# Patient Record
Sex: MALE | Race: BLACK OR AFRICAN AMERICAN | Employment: FULL TIME | RURAL
[De-identification: names, ages, dates, MRNs, and addresses within clinical notes are randomized per-mention and may not be internally consistent; named-entity substitution may affect disease eponyms.]

---

## 2022-01-10 ENCOUNTER — HOSPITAL ENCOUNTER (EMERGENCY)
Age: 32
Discharge: HOME | End: 2022-01-10
Attending: EMERGENCY MEDICINE

## 2022-01-10 VITALS
SYSTOLIC BLOOD PRESSURE: 128 MMHG | HEART RATE: 56 BPM | BODY MASS INDEX: 24.23 KG/M2 | HEIGHT: 76 IN | RESPIRATION RATE: 17 BRPM | DIASTOLIC BLOOD PRESSURE: 77 MMHG | WEIGHT: 199 LBS | OXYGEN SATURATION: 98 % | TEMPERATURE: 98.2 F

## 2022-01-10 DIAGNOSIS — K02.9 DENTAL CARIES: ICD-10-CM

## 2022-01-10 DIAGNOSIS — K08.89 PAIN, DENTAL: ICD-10-CM

## 2022-01-10 DIAGNOSIS — R51.9 HEAD AND FACE PAIN: Primary | ICD-10-CM

## 2022-01-10 PROCEDURE — 74011000250 HC RX REV CODE- 250: Performed by: EMERGENCY MEDICINE

## 2022-01-10 PROCEDURE — 99282 EMERGENCY DEPT VISIT SF MDM: CPT

## 2022-01-10 PROCEDURE — 74011250637 HC RX REV CODE- 250/637: Performed by: EMERGENCY MEDICINE

## 2022-01-10 RX ORDER — PENICILLIN V POTASSIUM 250 MG/1
500 TABLET, FILM COATED ORAL
Status: COMPLETED | OUTPATIENT
Start: 2022-01-10 | End: 2022-01-10

## 2022-01-10 RX ORDER — NAPROXEN 250 MG/1
500 TABLET ORAL
Status: COMPLETED | OUTPATIENT
Start: 2022-01-10 | End: 2022-01-10

## 2022-01-10 RX ORDER — NAPROXEN 500 MG/1
500 TABLET ORAL
Qty: 20 TABLET | Refills: 0 | Status: SHIPPED | OUTPATIENT
Start: 2022-01-10

## 2022-01-10 RX ORDER — PENICILLIN V POTASSIUM 500 MG/1
500 TABLET, FILM COATED ORAL 4 TIMES DAILY
Qty: 28 TABLET | Refills: 0 | Status: SHIPPED | OUTPATIENT
Start: 2022-01-10 | End: 2022-01-17

## 2022-01-10 RX ADMIN — LIDOCAINE HYDROCHLORIDE: 20 SOLUTION ORAL; TOPICAL at 06:49

## 2022-01-10 RX ADMIN — NAPROXEN 500 MG: 250 TABLET ORAL at 06:49

## 2022-01-10 RX ADMIN — PENICILLIN V POTASIUM 500 MG: 250 TABLET ORAL at 06:49

## 2022-01-10 NOTE — ED TRIAGE NOTES
Patient states that he has been having dental pain for the past couple of weeks in his right upper dental area. . Patient states that he has a dental appointment but could not handle the pain.  10/10

## 2022-01-10 NOTE — ED PROVIDER NOTES
EMERGENCY DEPARTMENT HISTORY AND PHYSICAL EXAM      Date: 1/10/2022  Patient Name: Miguelina Singh    History of Presenting Illness     Chief Complaint   Patient presents with    Dental Pain       History Provided By: Patient    HPI:   The history is provided by the patient. Dental Pain   This is a recurrent problem. The current episode started more than 1 week ago. The problem occurs constantly. The problem has not changed since onset. The pain is located in the right upper mouth. The pain is moderate. There was no vomiting, no nausea, no fever, no swelling, no headaches and no drainage. He has tried acetaminophen for the symptoms. The treatment provided no relief. The patient has no cardiac history. Miguelina Singh, 27 y.o. male  presents to the ED with cc of right upper dental pain with a dental caries. Reports has been vomiting for last 2 weeks, he made appointment with the dental clinic in 2 weeks. He took Tylenol for pain. He reports the pain is worse with air exposure and cold liquids. Patient reports the pain got worse over the last day or so, had difficult time sleeping last night due to the increasing pain. He denies any facial swelling denies any fevers chills denies any other upper respiratory symptoms. Denies any trauma. There are no other complaints, changes, or physical findings at this time. PCP: None    No current facility-administered medications on file prior to encounter. Current Outpatient Medications on File Prior to Encounter   Medication Sig Dispense Refill    fluticasone propionate (FLONASE) 50 mcg/actuation nasal spray 2 Sprays by Both Nostrils route daily. 1 Bottle 0    naproxen (NAPROSYN) 500 mg tablet Take 1 Tab by mouth every twelve (12) hours as needed for Pain. 20 Tab 0       Past History     Past Medical History:  No past medical history on file. Past Surgical History:  No past surgical history on file.     Family History:  No family history on file.    Social History:  Social History     Tobacco Use    Smoking status: Current Every Day Smoker    Smokeless tobacco: Never Used   Substance Use Topics    Alcohol use: Yes     Comment: occ    Drug use: No       Allergies:  No Known Allergies      Review of Systems   Review of Systems   Constitutional: Negative. Negative for chills and fever. HENT: Positive for dental problem. Negative for congestion, ear pain, rhinorrhea and sore throat. Respiratory: Negative. Negative for cough and shortness of breath. Cardiovascular: Negative. Negative for chest pain and palpitations. Gastrointestinal: Negative. Negative for nausea and vomiting. Endocrine: Negative. Musculoskeletal: Negative. Negative for arthralgias, back pain and neck pain. Skin: Negative. Negative for rash and wound. Allergic/Immunologic: Negative. Neurological: Negative. Negative for dizziness and headaches. Hematological: Negative. Negative for adenopathy. Does not bruise/bleed easily. Psychiatric/Behavioral: Negative. Negative for confusion. The patient is not nervous/anxious. All other systems reviewed and are negative. Physical Exam   Physical Exam  Vitals and nursing note reviewed. Constitutional:       General: He is not in acute distress. Appearance: Normal appearance. He is well-developed. He is not ill-appearing, toxic-appearing or diaphoretic. HENT:      Head: Normocephalic and atraumatic. Nose: Nose normal.      Mouth/Throat:      Lips: Pink. Mouth: Mucous membranes are moist.      Dentition: Dental tenderness and dental caries present. No dental abscesses. Tongue: No lesions. Palate: No lesions. Pharynx: Oropharynx is clear. No pharyngeal swelling or posterior oropharyngeal erythema. Tonsils: No tonsillar exudate. Eyes:      Extraocular Movements: Extraocular movements intact.       Conjunctiva/sclera: Conjunctivae normal.      Pupils: Pupils are equal, round, and reactive to light. Cardiovascular:      Rate and Rhythm: Normal rate and regular rhythm. Pulses: Normal pulses. Pulmonary:      Effort: Pulmonary effort is normal. No respiratory distress. Abdominal:      General: There is no distension. Palpations: Abdomen is soft. Musculoskeletal:         General: No swelling or tenderness. Normal range of motion. Cervical back: Normal range of motion and neck supple. No rigidity or tenderness. No muscular tenderness. Skin:     General: Skin is warm and dry. Capillary Refill: Capillary refill takes less than 2 seconds. Findings: No erythema or rash. Neurological:      General: No focal deficit present. Mental Status: He is alert and oriented to person, place, and time. Mental status is at baseline. Cranial Nerves: No cranial nerve deficit. Sensory: No sensory deficit. Motor: No weakness. Psychiatric:         Mood and Affect: Mood normal.         Behavior: Behavior normal.         Thought Content: Thought content normal.         Judgment: Judgment normal.         Diagnostic Study Results     Labs -   No results found for this or any previous visit (from the past 12 hour(s)). Radiologic Studies -   No orders to display     CT Results  (Last 48 hours)    None        CXR Results  (Last 48 hours)    None          Medical Decision Making   I am the first provider for this patient. I reviewed the vital signs, available nursing notes, past medical history, past surgical history, family history and social history. Vital Signs-Reviewed the patient's vital signs. No data found. Records Reviewed: Nursing Notes and Old Medical Records    Provider Notes (Medical Decision Making):   Patient with dental caries, no evidence of dental abscess will start on antibiotics anti-inflammatories and give dental balls to take for pain. ED Course:   Initial assessment performed.  The patients presenting problems have been discussed, and they are in agreement with the care plan formulated and outlined with them. I have encouraged them to ask questions as they arise throughout their visit. Medications Given in the ED:    Medications   naproxen (NAPROSYN) tablet 500 mg (has no administration in time range)   penicillin v potassium (VEETID) tablet 500 mg (has no administration in time range)   dental ball (lidocaine/Benadryl/Cetacaine) mixture (has no administration in time range)           6:31 AM    Patient presents complaining of dental pain has dental caries on exam no abscess, will start on antibiotics to cover for infection give naproxen and dental balls to use for pain, he will see the dental clinic for follow-up. Pt has been re-examined and states that they are feeling better and have no new complaints. medications, diagnosis, follow up plan and return instructions have been reviewed and discussed with the patient and/or family. Pt and/or family were instructed on symptoms that may arise after discharge requiring re-evaluation by a physician. Pt and/or family have had the opportunity to ask questions about their care. Patient and/or family verbalized understanding and agreement with care plan, follow up and return instructions. Patient and/or family agree to return in 50 hours if their symptoms are not improving or immediately if they have any change in their condition. I have also put together some discharge instructions for patient that include: 1) educational information regarding their diagnosis, 2) how to care for their diagnosis at home, as well a 3) list of reasons why they would want to return to the ED prior to their follow-up appointment, should their condition change. Ita Echeverria MD      Procedures        Disposition:    Discharged    DISCHARGE PLAN:  1.    Current Discharge Medication List      START taking these medications    Details   penicillin v potassium (VEETID) 500 mg tablet Take 1 Tablet by mouth four (4) times daily for 7 days. Qty: 28 Tablet, Refills: 0  Start date: 1/10/2022, End date: 1/17/2022      !! naproxen (NAPROSYN) 500 mg tablet Take 1 Tablet by mouth every twelve (12) hours as needed for Pain. Qty: 20 Tablet, Refills: 0  Start date: 1/10/2022       !! - Potential duplicate medications found. Please discuss with provider. CONTINUE these medications which have NOT CHANGED    Details   !! naproxen (NAPROSYN) 500 mg tablet Take 1 Tab by mouth every twelve (12) hours as needed for Pain. Qty: 20 Tab, Refills: 0       !! - Potential duplicate medications found. Please discuss with provider. 2.   Follow-up Information     Follow up With Specialties Details Why [address] [place] [address]  Schedule an appointment as soon as possible for a visit in 2 days For follow up [address] [place] [address]  [phone]        3. Return to ED if worse     Diagnosis     Clinical Impression:   1. Head and face pain    2. Dental caries    3. Pain, dental        Attestations: Joanie Mijares MD    Please note that this dictation was completed with [employer], the computer voice recognition software. Quite often unanticipated grammatical, syntax, homophones, and other interpretive errors are inadvertently transcribed by the computer software. Please disregard these errors. Please excuse any errors that have escaped final proofreading. Thank you.

## 2022-06-26 ENCOUNTER — HOSPITAL ENCOUNTER (EMERGENCY)
Age: 32
Discharge: HOME | End: 2022-06-26
Attending: EMERGENCY MEDICINE

## 2022-06-26 ENCOUNTER — APPOINTMENT (OUTPATIENT)
Dept: GENERAL RADIOLOGY | Age: 32
End: 2022-06-26
Attending: EMERGENCY MEDICINE

## 2022-06-26 VITALS
TEMPERATURE: 98.8 F | OXYGEN SATURATION: 100 % | WEIGHT: 192 LBS | BODY MASS INDEX: 23.38 KG/M2 | HEIGHT: 76 IN | RESPIRATION RATE: 16 BRPM | HEART RATE: 89 BPM | SYSTOLIC BLOOD PRESSURE: 119 MMHG | DIASTOLIC BLOOD PRESSURE: 49 MMHG

## 2022-06-26 DIAGNOSIS — S61.216A LACERATION OF RIGHT LITTLE FINGER WITHOUT FOREIGN BODY WITHOUT DAMAGE TO NAIL, INITIAL ENCOUNTER: ICD-10-CM

## 2022-06-26 DIAGNOSIS — S61.411A LACERATION OF RIGHT HAND WITHOUT FOREIGN BODY, INITIAL ENCOUNTER: Primary | ICD-10-CM

## 2022-06-26 PROCEDURE — 74011000250 HC RX REV CODE- 250: Performed by: EMERGENCY MEDICINE

## 2022-06-26 PROCEDURE — 74011250637 HC RX REV CODE- 250/637: Performed by: EMERGENCY MEDICINE

## 2022-06-26 PROCEDURE — 90715 TDAP VACCINE 7 YRS/> IM: CPT | Performed by: EMERGENCY MEDICINE

## 2022-06-26 PROCEDURE — 75810000293 HC SIMP/SUPERF WND  RPR

## 2022-06-26 PROCEDURE — 73130 X-RAY EXAM OF HAND: CPT

## 2022-06-26 PROCEDURE — 74011250636 HC RX REV CODE- 250/636: Performed by: EMERGENCY MEDICINE

## 2022-06-26 PROCEDURE — 90471 IMMUNIZATION ADMIN: CPT

## 2022-06-26 PROCEDURE — 99284 EMERGENCY DEPT VISIT MOD MDM: CPT

## 2022-06-26 RX ORDER — BUPIVACAINE HYDROCHLORIDE 5 MG/ML
5 INJECTION, SOLUTION EPIDURAL; INTRACAUDAL ONCE
Status: COMPLETED | OUTPATIENT
Start: 2022-06-26 | End: 2022-06-26

## 2022-06-26 RX ORDER — OXYCODONE HYDROCHLORIDE 5 MG/1
5 TABLET ORAL
Status: DISCONTINUED | OUTPATIENT
Start: 2022-06-26 | End: 2022-06-26 | Stop reason: HOSPADM

## 2022-06-26 RX ORDER — LIDOCAINE HYDROCHLORIDE 10 MG/ML
5 INJECTION, SOLUTION EPIDURAL; INFILTRATION; INTRACAUDAL; PERINEURAL ONCE
Status: DISCONTINUED | OUTPATIENT
Start: 2022-06-26 | End: 2022-06-26

## 2022-06-26 RX ORDER — DOXYCYCLINE HYCLATE 100 MG
100 TABLET ORAL 2 TIMES DAILY
Qty: 14 TABLET | Refills: 0 | Status: SHIPPED | OUTPATIENT
Start: 2022-06-26 | End: 2022-07-03

## 2022-06-26 RX ORDER — LIDOCAINE HYDROCHLORIDE 10 MG/ML
5 INJECTION INFILTRATION; PERINEURAL ONCE
Status: COMPLETED | OUTPATIENT
Start: 2022-06-26 | End: 2022-06-26

## 2022-06-26 RX ORDER — OXYCODONE HYDROCHLORIDE 5 MG/1
5 TABLET ORAL
Status: COMPLETED | OUTPATIENT
Start: 2022-06-26 | End: 2022-06-26

## 2022-06-26 RX ADMIN — OXYCODONE 5 MG: 5 TABLET ORAL at 12:13

## 2022-06-26 RX ADMIN — TETANUS TOXOID, REDUCED DIPHTHERIA TOXOID AND ACELLULAR PERTUSSIS VACCINE, ADSORBED 0.5 ML: 5; 2.5; 8; 8; 2.5 SUSPENSION INTRAMUSCULAR at 12:23

## 2022-06-26 RX ADMIN — LIDOCAINE HYDROCHLORIDE 5 ML: 10 INJECTION, SOLUTION INFILTRATION; PERINEURAL at 12:13

## 2022-06-26 RX ADMIN — BUPIVACAINE HYDROCHLORIDE 25 MG: 5 INJECTION, SOLUTION EPIDURAL; INTRACAUDAL at 12:13

## 2022-06-26 NOTE — Clinical Note
[address] [place] [address] EMERGENCY DEP  [address] [facility] [address] Laruen Gray [address]  [phone]    Work/School Note    Date: 6/26/2022    To Whom It May concern:    Geri Archer was seen and treated today in the emergency room by the following provider(s):  Attending Provider: Rachel [place] DO. Geri Archer is excused from work/school on 6/26/2022 through 6/28/2022. He is medically clear to return to work/school on 6/29/2022.          Sincerely,          Sanjuanita Salazar DO

## 2022-06-26 NOTE — Clinical Note
[address] [place] [address] EMERGENCY DEP  [address] [facility] [address] Lit [place] [address]  [phone]    Work/School Note    Date: 6/26/2022    To Whom It May concern:    Be Kennedy was seen and treated today in the emergency room by the following provider(s):  Attending Provider: Kristal Foster DO. Be Kennedy is excused from work/school on 06/26/22 and 06/27/22. He is medically clear to return to work/school on 6/28/2022.        Sincerely,          Mari Crandall DO

## 2022-06-26 NOTE — Clinical Note
[address] [place] [address] EMERGENCY DEP  [address] [facility] [address] Shiela Reddy [address]  [phone]    Work/School Note    Date: 6/26/2022    To Whom It May concern:    Román Rosas was seen and treated today in the emergency room by the following provider(s):  Attending Provider: Jn Walter DO. Román Rosas is excused from work/school on 06/26/22 and 06/27/22. He is medically clear to return to work/school on 6/28/2022.        Sincerely,          Rubin Malloy DO

## 2022-06-26 NOTE — Clinical Note
[address] [place] [address] EMERGENCY DEP  [address] [facility] [address] Natalia Rodney [address]  [phone]    Work/School Note    Date: 6/26/2022    To Whom It May concern:    Vlad Gtz was seen and treated today in the emergency room by the following provider(s):  Attending Provider: Ava Garcia DO. Vlad Gtz is excused from work/school on 6/26/2022 through 6/28/2022. He is medically clear to return to work/school on 6/29/2022.          Sincerely,          Sylvia Platt DO

## 2022-06-27 NOTE — ED PROVIDER NOTES
EMERGENCY DEPARTMENT HISTORY AND PHYSICAL EXAM      Date: 6/26/2022  Patient Name: Renuka Suero    History of Presenting Illness     Chief Complaint   Patient presents with    Laceration       History Provided By: Patient    HPI: Renuka Suero, 27 y.o. male presents to the ED with cc of right hand injury. Patient states he was using a chain saw to do some tree work and had shot the engine down however the saw was still running and when he went to turn it caused him in his right hand. This resulted in a laceration in between his thumb and second finger and an additional laceration to the palmar aspect of the right fifth digit. Bleeding had been controlled upon arrival to the emergency department. Patient states pain is rated a 10 out of 10 worse with any movement or touch. He denies any other injuries. He states his tetanus is not up-to-date. He denies any other recent illness. He denies any history of bleeding disorders. There are no other complaints, changes, or physical findings at this time. PCP: None    No current facility-administered medications on file prior to encounter. Current Outpatient Medications on File Prior to Encounter   Medication Sig Dispense Refill    naproxen (NAPROSYN) 500 mg tablet Take 1 Tablet by mouth every twelve (12) hours as needed for Pain. 20 Tablet 0    fluticasone propionate (FLONASE) 50 mcg/actuation nasal spray 2 Sprays by Both Nostrils route daily. 1 Bottle 0    naproxen (NAPROSYN) 500 mg tablet Take 1 Tab by mouth every twelve (12) hours as needed for Pain.  20 Tab 0       Past History     Past Medical History:  None    Past Surgical History:  None    Family History:  Non-contributory    Social History:  Social History     Tobacco Use    Smoking status: Current Every Day Smoker    Smokeless tobacco: Never Used   Substance Use Topics    Alcohol use: Yes     Comment: occ    Drug use: No       Allergies:  No Known Allergies      Review of Systems Review of Systems   Constitutional: Negative. HENT: Negative. Respiratory: Negative. Cardiovascular: Negative. Musculoskeletal:        Right hand pain    Skin: Positive for wound (Lacerations to right hand). Neurological: Negative. Negative for weakness and numbness. Hematological: Does not bruise/bleed easily. Psychiatric/Behavioral: Negative. Physical Exam   Physical Exam  Vitals and nursing note reviewed. Constitutional:       General: He is not in acute distress. Appearance: Normal appearance. HENT:      Head: Normocephalic and atraumatic. Mouth/Throat:      Mouth: Mucous membranes are moist.   Cardiovascular:      Rate and Rhythm: Normal rate and regular rhythm. Pulses: Normal pulses. Pulmonary:      Effort: Pulmonary effort is normal. No respiratory distress. Breath sounds: Normal breath sounds. No stridor. Musculoskeletal:        Hands:    Skin:     General: Skin is warm and dry. Capillary Refill: Capillary refill takes less than 2 seconds. Neurological:      Mental Status: He is alert and oriented to person, place, and time. Cranial Nerves: No cranial nerve deficit. Comments: No gross motor or sensory deficits    Psychiatric:         Mood and Affect: Mood normal.         Behavior: Behavior normal.         Diagnostic Study Results     Labs -  None    Radiologic Studies -   XR HAND RT MIN 3 V   Final Result   No acute bony abnormality or radiopaque foreign body. Thenar soft   tissue injury is noted. CT Results  (Last 48 hours)    None        CXR Results  (Last 48 hours)    None          Medical Decision Making   I am the first provider for this patient. I reviewed the vital signs, available nursing notes, past medical history, past surgical history, family history and social history. Vital Signs-Reviewed the patient's vital signs.   Patient Vitals for the past 12 hrs:   Temp Pulse Resp BP SpO2   06/26/22 1200 98.8 °F (37.1 °C) 89 16 (!) 119/49 100 %     Records Reviewed: Nursing Notes    Provider Notes (Medical Decision Making):   DDx- Lacerations to hand, hand fx    ED Course:   Initial assessment performed. The patients presenting problems have been discussed, and they are in agreement with the care plan formulated and outlined with them. I have encouraged them to ask questions as they arise throughout their visit. Wound between thumg explored- no tendon involvement, Full ROM of thumb and 2nd digit against resistance. Tetanus updated. PROCEDURES  Procedure Note - Laceration Repair:  Procedure by Jaylin Santos DO  Complexity: Simple  3cm linear/jagged laceration to right hand in web space between 1st and 2nd digits  was irrigated copiously with NS under jet lavage, prepped withhibiclens and draped in a sterile fashion. The area was anesthetized with 4 mLs of 1% Lidocaine w/o, 0.5% Marcaine 1:1} via local infiltration. The wound was explored with the following results: No foreign bodies found, No tendon laceration seen, Extensor tendon laceration seen, Flexor tendon laceration seen. The wound was repaired with One layer suture closure: Skin Layer:  7 sutures placed, stitch type:simple interrupted, suture: 4-0 nylon. .  The wound was closed with good hemostasis and approximation. Sterile dressing applied. Estimated blood loss: <5mls  The procedure took 15 minutes, and pt tolerated well. Procedure Note - Laceration Repair:  Procedure by Jaylin Santos DO  Complexity: Simple  1.5cm linear laceration to right hand 5th digit proximal phalanx palmar side  was irrigated copiously with NS under jet lavage, prepped withhibiclens and draped in a sterile fashion. The area was anesthetized with 1ml mLs of 1% Lidocaine w/o, 0.5% Marcaine 1:1} via local infiltration.   The wound was explored with the following results: No foreign bodies found, No tendon laceration seen, Extensor tendon laceration seen, Flexor tendon laceration seen.  The wound was repaired with One layer suture closure: Skin Layer:  4 sutures placed, stitch type:simple interrupted, suture: 4-0 nylon. .  The wound was closed with good hemostasis and approximation. Sterile dressing applied. Estimated blood loss: <5mls  The procedure took 10 minutes, and pt tolerated well. Disposition:  DC home, suture removal 10-14 days, RX for AB written discussed with pt to fill he begins developing redness or streaking up the arm. DISCHARGE PLAN:  1. Discharge Medication List as of 6/26/2022  1:39 PM      START taking these medications    Details   doxycycline (VIBRA-TABS) 100 mg tablet Take 1 Tablet by mouth two (2) times a day for 7 days. , Print, Disp-14 Tablet, R-0         CONTINUE these medications which have NOT CHANGED    Details   !! naproxen (NAPROSYN) 500 mg tablet Take 1 Tablet by mouth every twelve (12) hours as needed for Pain., Print, Disp-20 Tablet, R-0      fluticasone propionate (FLONASE) 50 mcg/actuation nasal spray 2 Sprays by Both Nostrils route daily. , Print, Disp-1 Bottle, R-0      !! naproxen (NAPROSYN) 500 mg tablet Take 1 Tab by mouth every twelve (12) hours as needed for Pain., Print, Disp-20 Tab, R-0       !! - Potential duplicate medications found. Please discuss with provider. 2.   Follow-up Information     Follow up With Specialties Details Why Contact Info    [address] [place] [address] Emergency Medicine  10-14 Days, For suture removal [address]        3. Return to ED if worse     Diagnosis     Clinical Impression:   1. Laceration of right hand without foreign body, initial encounter    2. Laceration of right little finger without foreign body without damage to nail, initial encounter        Attestations:    Jaylin Santos, DO    Please note that this dictation was completed with [employer] voice recognition software.   Quite often unanticipated grammatical, syntax, homophones, and other interpretive errors are inadvertently transcribed by the computer software. Please disregard these errors. Please excuse any errors that have escaped final proofreading. Thank you.

## 2022-07-08 ENCOUNTER — HOSPITAL ENCOUNTER (EMERGENCY)
Age: 32
Discharge: HOME | End: 2022-07-08
Attending: FAMILY MEDICINE

## 2022-07-08 VITALS
RESPIRATION RATE: 16 BRPM | OXYGEN SATURATION: 96 % | TEMPERATURE: 98.4 F | SYSTOLIC BLOOD PRESSURE: 123 MMHG | HEART RATE: 69 BPM | DIASTOLIC BLOOD PRESSURE: 61 MMHG

## 2022-07-08 DIAGNOSIS — Z48.02 ENCOUNTER FOR REMOVAL OF SUTURES: Primary | ICD-10-CM

## 2022-07-08 PROCEDURE — 75810000275 HC EMERGENCY DEPT VISIT NO LEVEL OF CARE

## 2022-07-08 NOTE — Clinical Note
[address] [place] [address] EMERGENCY DEP  [address] [facility] [address] Jazlyn Dillon [address]  [phone]    Work/School Note    Date: 7/8/2022    To Whom It May concern:    Terrell Salazar was seen and treated today in the emergency room by the following provider(s):  Attending Provider: Kimani Dunlap MD.      Terrell Salazar is excused from work/school on 7/8/2022 through 7/10/2022. He is medically clear to return to work/school on 7/11/2022.          Sincerely,          Ailyn Pryor MD

## 2022-07-08 NOTE — Clinical Note
[address] [place] [address] EMERGENCY DEP  [address] [facility] [address] Suha [place] [address]  [phone]    Work/School Note    Date: 7/8/2022    To Whom It May concern:    Kash Retana was seen and treated today in the emergency room by the following provider(s):  Attending Provider: Mariann Essex, MD.      Kash Retana is excused from work/school on 7/8/2022 through 7/10/2022. He is medically clear to return to work/school on 7/11/2022.          Sincerely,          Romina Curiel MD

## 2022-07-09 NOTE — ED TRIAGE NOTES
Pt arrived POV to have stitches removed from R hand. Pt states the stitches have been in for approx. 12 days.

## 2022-07-09 NOTE — DISCHARGE INSTRUCTIONS
Return for evidence of infection or problems as noted above. Keep wound clean and until closed, Tylenol or Naprosyn as needed for pain.

## 2022-07-09 NOTE — ED PROVIDER NOTES
[address] [place] [address]   EMERGENCY DEPARTMENT          Date: 7/8/2022  Patient: Verónica Walter MRN: [id number]  SSN: [id number]    YOB: 1990  Age: 27 y.o. Sex: male      PCP: None  The patient arrived by private car and is alone. History of Presenting Illness     Chief Complaint   Patient presents with    Wound Check       History Provided By: Patient    HPI: Verónica Walter is a 27 y.o. male, pmhx laceration, who presents ambulatory to the ED c/o wound check, need for suture removal.  Patient sustained a chainsaw injury to his right hand on June 26, 2022, 12 days prior to arrival.  He presented to this facility for evaluation and management. X-rays were unremarkable and he received a total of 11 sutures to his right hand. Patient states that since going home he is doing well. He has had no drainage fever bleeding or spreading redness. Pain is being controlled. Several of the sutures have come out spontaneously he states. Past History   History reviewed. No pertinent past medical history. No past surgical history on file. History reviewed. No pertinent family history. Social History     Tobacco Use    Smoking status: Current Every Day Smoker    Smokeless tobacco: Never Used   Substance Use Topics    Alcohol use: Yes     Comment: occ    Drug use: No       No Known Allergies    Current Outpatient Medications   Medication Sig Dispense Refill    naproxen (NAPROSYN) 500 mg tablet Take 1 Tablet by mouth every twelve (12) hours as needed for Pain. 20 Tablet 0    fluticasone propionate (FLONASE) 50 mcg/actuation nasal spray 2 Sprays by Both Nostrils route daily. 1 Bottle 0    naproxen (NAPROSYN) 500 mg tablet Take 1 Tab by mouth every twelve (12) hours as needed for Pain. 20 Tab 0         Review of Systems     Review of Systems   All other systems reviewed and are negative. Physical Exam     Physical Exam  Vitals and nursing note reviewed. Constitutional:       General: He is not in acute distress. Appearance: He is well-developed and normal weight. He is not ill-appearing, toxic-appearing or diaphoretic. HENT:      Head: Normocephalic and atraumatic. Mouth/Throat:      Pharynx: No pharyngeal swelling. Eyes:      Extraocular Movements: Extraocular movements intact. Pupils: Pupils are equal, round, and reactive to light. Cardiovascular:      Rate and Rhythm: Normal rate and regular rhythm. Pulmonary:      Effort: Pulmonary effort is normal. No respiratory distress. Abdominal:      Palpations: There is no hepatomegaly, splenomegaly or pulsatile mass. Musculoskeletal:         General: Normal range of motion. Comments: Lacerations to the webspace of the right hand and medial and appear to be healing well. No evidence of secondary infection. No drainage is noted. A total of 7 sutures were removed. No dehiscence was noted after sutures were removed. Skin:     General: Skin is dry. Capillary Refill: Capillary refill takes less than 2 seconds. Findings: No bruising, erythema or rash. Neurological:      General: No focal deficit present. Mental Status: He is alert and oriented to person, place, and time. Cranial Nerves: No cranial nerve deficit. Motor: No weakness. Gait: Gait normal.   Psychiatric:         Mood and Affect: Mood normal.         Behavior: Behavior normal.         Thought Content: Thought content normal.         Judgment: Judgment normal.           Diagnostic Study Results     Labs -   No results found for this or any previous visit (from the past 48 hour(s)).      Radiologic Studies -   CT Results  (Last 48 hours)    None        No orders to display         Procedures     Procedures      Medical Decision Making     Records Reviewed: Nursing Notes, Old Medical Records and Previous Radiology Studies    Vital Signs  Patient Vitals for the past 24 hrs:   Temp Pulse Resp BP SpO2 07/08/22 2003 98.4 °F (36.9 °C) 69 16 123/61 96 %       Pulse Oximetry Analysis - 96% on RA    I am the first provider for this patient. I reviewed the vital signs, available nursing notes, past medical history, past surgical history, family history and social history, performed a physical exam from this visit    MDM  Number of Diagnoses or Management Options  Encounter for removal of sutures: minor     Amount and/or Complexity of Data Reviewed  Review and summarize past medical records: yes    Risk of Complications, Morbidity, and/or Mortality  Presenting problems: low  Diagnostic procedures: minimal  Management options: low  General comments: Differential includes wound infection, wound dehiscence,    Patient Progress  Patient progress: improved      ED Course     Initial assessment performed. The patients presenting problems have been discussed, and they are in agreement with the care plan formulated and outlined with them. I have encouraged them to ask questions as they arise throughout their visit. ED Course as of 07/09/22 0545   Sat Jul 09, 2022   0544 Sutures removed. Patient tolerated this procedure well. No evidence of secondary infection. Given a work note for the next 3 days to keep hand dry the patient works as a [employer]. Return instructions discussed. [PS]      ED Course User Index  [PS] Gurpreet Bermudez MD        No orders of the defined types were placed in this encounter. MEDICATIONS GIVEN:    Medications - No data to display      Diagnosis     Clinical Impression:   1. Encounter for removal of sutures            Disposition     Discharge note:    I have reviewed all pertinent and currently available diagnostic test results for this visit including, but not limited to, labs, xrays, and EKGs. I have reviewed all pertinent and currently available medical records.  My plan of care and further evaluation and/or disposition is based on these results, as well as the initial, and subsequent, history and physical exam, as well as any additional complaints during the visit. Pt has been re-examined, appears to be stable states that they are feeling better and have no new complaints. Patient has nontoxic appearance and condition is stable for discharge. , diagnosis, follow up plan and return instructions have been reviewed and discussed with the patient and/or family. Pt and/or family were instructed on symptoms that may arise after discharge requiring re-evaluation by a physician. Pt and/or family have had the opportunity to ask questions about their care. Patient and/or family verbalized understanding and agreement with care plan, follow up and return instructions. Patient and/or family agree to return if their symptoms are not improving or immediately if they have any change in their condition. I have also put together some discharge instructions for the patient that include: 1) educational information regarding their diagnosis, 2) how to care for their diagnosis at home, as well a 3) list of reasons why they would want to return to the ED prior to their follow-up appointment, should their condition change as well as instructions to return to the ED should sx worsen at any time. Vital signs stable for discharge. PLAN:  1. Discharge home in stable condition  2. Discharge Medication List as of 7/8/2022  8:36 PM        3. Follow-up Information     Follow up With Specialties Details Why Contact Info    [facility] Emergency Medicine In 3 days if not improving or getting worse [address] [phone]        4. Discharged    Return to ED if worse    Please note, this dictation was completed with [employer], the computer voice recognition software. Quite often unanticipated grammatical, syntax, homophones, and other interpretive errors are inadvertently transcribed by the computer software. Please disregard these errors.  Please excuse any errors that have escaped final proof reading.       Paulo Matthews MD

## 2022-08-11 ENCOUNTER — HOSPITAL ENCOUNTER (EMERGENCY)
Age: 32
Discharge: HOME | End: 2022-08-11
Attending: EMERGENCY MEDICINE

## 2022-08-11 ENCOUNTER — APPOINTMENT (OUTPATIENT)
Dept: GENERAL RADIOLOGY | Age: 32
End: 2022-08-11
Attending: EMERGENCY MEDICINE

## 2022-08-11 VITALS
HEIGHT: 76 IN | BODY MASS INDEX: 25.57 KG/M2 | RESPIRATION RATE: 18 BRPM | SYSTOLIC BLOOD PRESSURE: 113 MMHG | TEMPERATURE: 97.9 F | DIASTOLIC BLOOD PRESSURE: 63 MMHG | WEIGHT: 210 LBS | OXYGEN SATURATION: 97 % | HEART RATE: 67 BPM

## 2022-08-11 DIAGNOSIS — L03.011 CELLULITIS OF RIGHT MIDDLE FINGER: Primary | ICD-10-CM

## 2022-08-11 PROCEDURE — 73140 X-RAY EXAM OF FINGER(S): CPT

## 2022-08-11 PROCEDURE — 99283 EMERGENCY DEPT VISIT LOW MDM: CPT

## 2022-08-11 RX ORDER — DOXYCYCLINE HYCLATE 100 MG
100 TABLET ORAL 2 TIMES DAILY
Qty: 20 TABLET | Refills: 0 | Status: SHIPPED | OUTPATIENT
Start: 2022-08-11 | End: 2022-08-21

## 2022-08-11 NOTE — ED TRIAGE NOTES
Pt arrived by POV with swelling to right middle finger. 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   Pt is awake alert and oriented x 4, pt educated on ER flow

## 2022-08-11 NOTE — ED PROVIDER NOTES
EMERGENCY DEPARTMENT HISTORY AND PHYSICAL EXAM      Date: 8/11/2022  Patient Name: Kali Burgos    History of Presenting Illness     Chief Complaint   Patient presents with    Finger Swelling       History Provided By: Patient    HPI: Kali Burgos, 27 y.o. male with no significant PMHx , presents ambulatory to the ED with cc of right middle finger swelling. Patient reports swelling in his middle phalanx of his middle finger since yesterday evening. He works as a  and was worried that he might of got stuck by a fish spine or bone but did not see any open wounds. No fevers or chills. No other areas affected. He reports some mild aching in the area. He is still been able to fish. He is right-hand dominant. No treatment prior to arrival    Tetanus in June of this year. PMHx: Significant for none  PSHx: Significant for none  Social Hx: Half pack a day smoker. Occasionally drinks alcohol. Denies illicit drug use. There are no other complaints, changes, or physical findings at this time. PCP: None    No current facility-administered medications on file prior to encounter. Current Outpatient Medications on File Prior to Encounter   Medication Sig Dispense Refill    naproxen (NAPROSYN) 500 mg tablet Take 1 Tablet by mouth every twelve (12) hours as needed for Pain. 20 Tablet 0    fluticasone propionate (FLONASE) 50 mcg/actuation nasal spray 2 Sprays by Both Nostrils route daily. 1 Bottle 0    naproxen (NAPROSYN) 500 mg tablet Take 1 Tab by mouth every twelve (12) hours as needed for Pain. 20 Tab 0       Past History     Past Medical History:  History reviewed. No pertinent past medical history. Past Surgical History:  No past surgical history on file. Family History:  History reviewed. No pertinent family history. Social History:  Social History     Tobacco Use    Smoking status: Every Day    Smokeless tobacco: Never   Substance Use Topics    Alcohol use:  Yes Comment: occ    Drug use: No       Allergies:  No Known Allergies      Review of Systems   Review of Systems   Constitutional:  Negative for activity change, chills and fever. HENT:  Negative for congestion and sore throat. Eyes:  Negative for pain and redness. Respiratory:  Negative for cough, chest tightness and shortness of breath. Cardiovascular:  Negative for chest pain and palpitations. Gastrointestinal:  Negative for abdominal pain, diarrhea, nausea and vomiting. Genitourinary:  Negative for dysuria, frequency and urgency. Musculoskeletal:  Negative for back pain and neck pain. Skin:  Positive for rash. Neurological:  Negative for syncope, light-headedness and headaches. Psychiatric/Behavioral:  Negative for confusion. All other systems reviewed and are negative. Physical Exam   Physical Exam  Constitutional:       General: He is not in acute distress. Appearance: He is well-developed. He is not diaphoretic. HENT:      Head: Normocephalic and atraumatic. Eyes:      General: No scleral icterus. Conjunctiva/sclera: Conjunctivae normal.      Pupils: Pupils are equal, round, and reactive to light. Pulmonary:      Effort: Pulmonary effort is normal.   Musculoskeletal:         General: Normal range of motion. Comments: Mild tenderness, erythema and swelling of the middle phalanx of the middle finger of the right hand. Skin intact. No open wounds. Proximal and distal phalanx nontender. PIP and DIP joints nontender. Full range of motion without difficulty. Nail intact. No paronychia. Skin:     General: Skin is warm and dry. Findings: No rash. Neurological:      Mental Status: He is alert and oriented to person, place, and time. Psychiatric:         Behavior: Behavior normal.           Diagnostic Study Results     Labs -   No results found for this or any previous visit (from the past 12 hour(s)).     Radiologic Studies -   XR 3RD FINGER RT MIN 2 V Final Result   Nonspecific soft tissue swelling        CT Results  (Last 48 hours)      None          CXR Results  (Last 48 hours)      None              Medical Decision Making   I am the first provider for this patient. I reviewed the vital signs, available nursing notes, past medical history, past surgical history, family history and social history. Vital Signs-Reviewed the patient's vital signs. Patient Vitals for the past 12 hrs:   Temp Pulse Resp BP SpO2   08/11/22 1330 97.9 °F (36.6 °C) 67 18 113/63 97 %           Records Reviewed: Nursing notes reviewed    Provider Notes (Medical Decision Making):   DDX: Cellulitis, foreign body    ED Course:   Initial assessment performed. The patients presenting problems have been discussed, and they are in agreement with the care plan formulated and outlined with them. I have encouraged them to ask questions as they arise throughout their visit. PROGRESS NOTE    Pt reevaluated. Pain films of finger without evidence of foreign body. No fracture. Will discharge with doxycycline and treat as cellulitis. Written by Tank Loomis MD     Progress note:    Pt noted to be feeling better and ready for discharge. Updated pt and/or family on all final lab and/or  imaging findings. Will follow up as instructed. All questions have been answered, pt voiced understanding and agreement with plan. Abx were prescribed, pt advised that diarrhea and rash are possible side effects of the medications. Specific return precautions provided as well as instructions to return to the ED should sx worsen at any time. Vital signs stable for discharge.      I have also put together some discharge instructions for them that include: 1) educational information regarding their diagnosis, 2) how to care for their diagnosis at home, as well a 3) list of reasons why they would want to return to the ED prior to their follow-up appointment, should their condition change. Written by Odilon Long MD        Critical Care Time:   0    Disposition:  Discharge    PLAN:  1. Current Discharge Medication List        START taking these medications    Details   doxycycline (VIBRA-TABS) 100 mg tablet Take 1 Tablet by mouth two (2) times a day for 10 days. Qty: 20 Tablet, Refills: 0  Start date: 8/11/2022, End date: 8/21/2022           2. Follow-up Information       Follow up With Specialties Details Why Contact Info    [address] [place] [address] Emergency Medicine Go in 1 day If symptoms worsen [address] [place] [address]  [phone]          Return to ED if worse     Diagnosis     Clinical Impression:   1. Cellulitis of right middle finger              Please note that this dictation was completed with [email or web address], the computer voice recognition software. Quite often unanticipated grammatical, syntax, homophones, and other interpretive errors are inadvertently transcribed by the computer software. Please disregard these errors. Please excuse any errors that have escaped final proofreading.

## 2022-12-02 ENCOUNTER — HOSPITAL ENCOUNTER (EMERGENCY)
Age: 32
Discharge: HOME | End: 2022-12-02
Attending: EMERGENCY MEDICINE

## 2022-12-02 VITALS
HEART RATE: 66 BPM | DIASTOLIC BLOOD PRESSURE: 77 MMHG | WEIGHT: 200 LBS | SYSTOLIC BLOOD PRESSURE: 130 MMHG | TEMPERATURE: 100.1 F | RESPIRATION RATE: 18 BRPM | OXYGEN SATURATION: 99 % | BODY MASS INDEX: 24.34 KG/M2

## 2022-12-02 DIAGNOSIS — J10.1 INFLUENZA A: Primary | ICD-10-CM

## 2022-12-02 LAB
FLUAV RNA SPEC QL NAA+PROBE: DETECTED
FLUBV RNA SPEC QL NAA+PROBE: NOT DETECTED
SARS-COV-2, COV2: NOT DETECTED

## 2022-12-02 PROCEDURE — 74011250636 HC RX REV CODE- 250/636: Performed by: EMERGENCY MEDICINE

## 2022-12-02 PROCEDURE — 99283 EMERGENCY DEPT VISIT LOW MDM: CPT

## 2022-12-02 PROCEDURE — 87636 SARSCOV2 & INF A&B AMP PRB: CPT

## 2022-12-02 RX ORDER — ONDANSETRON 4 MG/1
4 TABLET, ORALLY DISINTEGRATING ORAL ONCE
Status: COMPLETED | OUTPATIENT
Start: 2022-12-02 | End: 2022-12-02

## 2022-12-02 RX ORDER — ONDANSETRON 4 MG/1
4 TABLET, ORALLY DISINTEGRATING ORAL
Qty: 30 TABLET | Refills: 0 | Status: SHIPPED | OUTPATIENT
Start: 2022-12-02

## 2022-12-02 RX ADMIN — ONDANSETRON 4 MG: 4 TABLET, ORALLY DISINTEGRATING ORAL at 21:33

## 2022-12-02 NOTE — Clinical Note
Zeyad Bran was seen and treated in our emergency department on 12/2/2022.     Zeyad Bran may return to work on 12/10/22    Melva Forbes MD

## 2022-12-03 NOTE — ED PROVIDER NOTES
EMERGENCY DEPARTMENT HISTORY AND PHYSICAL EXAM      Date: 12/2/2022  Patient Name: Parul Smith    History of Presenting Illness     Chief Complaint   Patient presents with    Generalized Body Aches    Nausea       History Provided By:     HPI: Parul Smith, 34 y.o. male , presents to the ED with cc of body aches, rhinorrhea and nausea that began this afternoon. No known sick contacts. Denies headache, cough, CP, SOB, abdominal pain, diarrhea. There are no other complaints, changes, or physical findings at this time. PCP: None    No current facility-administered medications on file prior to encounter. Current Outpatient Medications on File Prior to Encounter   Medication Sig Dispense Refill    naproxen (NAPROSYN) 500 mg tablet Take 1 Tablet by mouth every twelve (12) hours as needed for Pain. 20 Tablet 0    fluticasone propionate (FLONASE) 50 mcg/actuation nasal spray 2 Sprays by Both Nostrils route daily. 1 Bottle 0    naproxen (NAPROSYN) 500 mg tablet Take 1 Tab by mouth every twelve (12) hours as needed for Pain. 20 Tab 0       Past History     Past Medical History:  No past medical history on file. Past Surgical History:  No past surgical history on file. Family History:  No family history on file. Social History:  Social History     Tobacco Use    Smoking status: Every Day    Smokeless tobacco: Never   Substance Use Topics    Alcohol use: Yes     Comment: occ    Drug use: No       Allergies:  No Known Allergies      Review of Systems   Review of Systems   HENT:  Positive for rhinorrhea. Gastrointestinal:  Positive for nausea. Musculoskeletal:  Positive for myalgias. All other systems reviewed and are negative. Physical Exam   Physical Exam  Vitals and nursing note reviewed. Constitutional:       Appearance: Normal appearance. HENT:      Head: Normocephalic and atraumatic.       Nose: Nose normal.      Mouth/Throat:      Mouth: Mucous membranes are moist.   Eyes: Extraocular Movements: Extraocular movements intact. Conjunctiva/sclera: Conjunctivae normal.   Cardiovascular:      Rate and Rhythm: Normal rate and regular rhythm. Pulses: Normal pulses. Heart sounds: Normal heart sounds. Pulmonary:      Effort: Pulmonary effort is normal. No respiratory distress. Breath sounds: Normal breath sounds. Abdominal:      General: Abdomen is flat. There is no distension. Palpations: Abdomen is soft. Tenderness: There is no abdominal tenderness. Musculoskeletal:      Cervical back: Normal range of motion and neck supple. Skin:     General: Skin is warm and dry. Capillary Refill: Capillary refill takes less than 2 seconds. Neurological:      General: No focal deficit present. Mental Status: He is alert and oriented to person, place, and time. Psychiatric:         Mood and Affect: Mood normal.         Behavior: Behavior normal.         Diagnostic Study Results     Labs -     Recent Results (from the past 12 hour(s))   COVID-19 WITH INFLUENZA A/B    Collection Time: 12/02/22  8:45 PM   Result Value Ref Range    SARS-CoV-2 by PCR Not detected NOTD      Influenza A by PCR Detected (A) NOTD      Influenza B by PCR Not detected NOTD         Radiologic Studies -   No orders to display     CT Results  (Last 48 hours)      None          CXR Results  (Last 48 hours)      None              Medical Decision Making   I am the first provider for this patient. I reviewed the vital signs, available nursing notes, past medical history, past surgical history, family history and social history. Vital Signs-Reviewed the patient's vital signs. Patient Vitals for the past 12 hrs:   Temp Pulse Resp BP SpO2   12/02/22 2020 100.1 °F (37.8 °C) 66 18 130/77 99 %         ED Course:   Initial assessment performed. The patients presenting problems have been discussed, and they are in agreement with the care plan formulated and outlined with them.   I have encouraged them to ask questions as they arise throughout their visit. ED Course as of 12/02/22 2128   Fri Dec 02, 2022   2049 Non toxic appearing. No abdominal ttp on exam. Pt with flu like sx, will swab for flu and Covid. [SJ]   2127 Flu positive. Pt instructed to take tylenol or motrin for fever and body aches. Will prescribe zofran. [SJ]      ED Course User Index  [SJ] Willem Yousif MD         Specific return precautions provided as well as instructions to return to the ED should sx worsen at any time. Vital signs stable for discharge. I have also put together some discharge instructions for them that include: 1) educational information regarding their diagnosis, 2) how to care for their diagnosis at home, as well a 3) list of reasons why they would want to return to the ED prior to their follow-up appointment, should their condition change. Critical Care Time:   0    Disposition:  Home    Return to ED if worse     Diagnosis     Clinical Impression:   1.  Influenza A

## 2023-03-14 ENCOUNTER — OFFICE VISIT (OUTPATIENT)
Dept: FAMILY MEDICINE CLINIC | Age: 33
End: 2023-03-14

## 2023-03-14 VITALS
HEART RATE: 61 BPM | RESPIRATION RATE: 16 BRPM | DIASTOLIC BLOOD PRESSURE: 80 MMHG | WEIGHT: 196 LBS | BODY MASS INDEX: 23.87 KG/M2 | SYSTOLIC BLOOD PRESSURE: 120 MMHG | OXYGEN SATURATION: 96 % | TEMPERATURE: 98.4 F | HEIGHT: 76 IN

## 2023-03-14 DIAGNOSIS — Z02.1 PHYSICAL EXAM, PRE-EMPLOYMENT: Primary | ICD-10-CM

## 2023-03-14 NOTE — PROGRESS NOTES
Eloisa Murphy is a 34 y.o. male presenting for/with:    Chief Complaint   Patient presents with    Employment Physical     [employer] physical        Visit Vitals  /80 (BP 1 Location: Left arm, BP Patient Position: Sitting)   Pulse 61   Temp 98.4 °F (36.9 °C) (Temporal)   Resp 16   Ht 6' 4\" (1.93 m)   Wt 196 lb (88.9 kg)   SpO2 96%   BMI 23.86 kg/m²     Pain Scale: 0 - No pain/10  Pain Location:     1. \"Have you been to the ER, urgent care clinic since your last visit? Hospitalized since your last visit? \" No    2. \"Have you seen or consulted any other health care providers outside of the [address] [place] [address] since your last visit? \" No     3. For patients aged 39-70: Has the patient had a colonoscopy / FIT/ Cologuard? No      If the patient is female:    4. For patients aged 41-77: Has the patient had a mammogram within the past 2 years? NA - based on age or sex      11. For patients aged 21-65: Has the patient had a pap smear? NA - based on age or sex          Patient    No flowsheet data found. No flowsheet data found. 3 most recent PHQ Screens 3/14/2023   Little interest or pleasure in doing things Not at all   Feeling down, depressed, irritable, or hopeless Not at all   Total Score PHQ 2 0     No flowsheet data found. No flowsheet data found. No flowsheet data found.

## 2023-07-30 ENCOUNTER — HOSPITAL ENCOUNTER (EMERGENCY)
Facility: HOSPITAL | Age: 33
Discharge: HOME | End: 2023-07-30
Attending: EMERGENCY MEDICINE
Payer: COMMERCIAL

## 2023-07-30 VITALS
WEIGHT: 198 LBS | RESPIRATION RATE: 14 BRPM | DIASTOLIC BLOOD PRESSURE: 82 MMHG | TEMPERATURE: 98.6 F | HEIGHT: 76 IN | OXYGEN SATURATION: 97 % | SYSTOLIC BLOOD PRESSURE: 127 MMHG | BODY MASS INDEX: 24.11 KG/M2 | HEART RATE: 63 BPM

## 2023-07-30 DIAGNOSIS — J01.00 ACUTE NON-RECURRENT MAXILLARY SINUSITIS: Primary | ICD-10-CM

## 2023-07-30 PROCEDURE — 99283 EMERGENCY DEPT VISIT LOW MDM: CPT

## 2023-07-30 RX ORDER — FLUTICASONE PROPIONATE 50 MCG
1 SPRAY, SUSPENSION (ML) NASAL DAILY
Qty: 32 G | Refills: 1 | Status: SHIPPED | OUTPATIENT
Start: 2023-07-30

## 2023-07-30 RX ORDER — AMOXICILLIN AND CLAVULANATE POTASSIUM 875; 125 MG/1; MG/1
1 TABLET, FILM COATED ORAL 2 TIMES DAILY
Qty: 14 TABLET | Refills: 0 | Status: SHIPPED | OUTPATIENT
Start: 2023-07-30 | End: 2023-08-06

## 2023-07-30 RX ORDER — PSEUDOEPHEDRINE HCL 30 MG
30 TABLET ORAL EVERY 4 HOURS PRN
Qty: 30 TABLET | Refills: 1 | Status: SHIPPED | OUTPATIENT
Start: 2023-07-30

## 2023-07-30 ASSESSMENT — PAIN - FUNCTIONAL ASSESSMENT: PAIN_FUNCTIONAL_ASSESSMENT: NONE - DENIES PAIN

## 2023-07-30 ASSESSMENT — LIFESTYLE VARIABLES: HOW OFTEN DO YOU HAVE A DRINK CONTAINING ALCOHOL: NEVER

## 2023-07-30 NOTE — DISCHARGE INSTRUCTIONS
Please take use the Sudafed and nasal spray. Try to drink plenty of water and rest.  If you are still feeling facial pressure and congestion on Tuesday, please start taking the antibiotic Augmentin. The Augmentin is taken 1 tablet in the morning and 1 tablet in the evening for 7 days.

## 2023-07-30 NOTE — ED NOTES
I have reviewed discharge instructions with the patient. The patient verbalized understanding. Discharge medications discussed with patient. No questions at this time. Ambulated without difficulty.       Mar Rodríguez RN  07/30/23 0387

## 2024-12-13 ENCOUNTER — HOSPITAL ENCOUNTER (EMERGENCY)
Facility: HOSPITAL | Age: 34
Discharge: HOME | End: 2024-12-13
Attending: EMERGENCY MEDICINE
Payer: COMMERCIAL

## 2024-12-13 VITALS
RESPIRATION RATE: 18 BRPM | WEIGHT: 220 LBS | HEART RATE: 84 BPM | BODY MASS INDEX: 26.79 KG/M2 | OXYGEN SATURATION: 98 % | SYSTOLIC BLOOD PRESSURE: 121 MMHG | HEIGHT: 76 IN | DIASTOLIC BLOOD PRESSURE: 68 MMHG | TEMPERATURE: 98.7 F

## 2024-12-13 DIAGNOSIS — R11.2 NAUSEA AND VOMITING, UNSPECIFIED VOMITING TYPE: Primary | ICD-10-CM

## 2024-12-13 PROCEDURE — 99283 EMERGENCY DEPT VISIT LOW MDM: CPT

## 2024-12-13 PROCEDURE — 6370000000 HC RX 637 (ALT 250 FOR IP): Performed by: EMERGENCY MEDICINE

## 2024-12-13 RX ORDER — ONDANSETRON 4 MG/1
8 TABLET, ORALLY DISINTEGRATING ORAL
Status: COMPLETED | OUTPATIENT
Start: 2024-12-13 | End: 2024-12-13

## 2024-12-13 RX ORDER — ONDANSETRON 4 MG/1
4 TABLET, ORALLY DISINTEGRATING ORAL 3 TIMES DAILY PRN
Qty: 21 TABLET | Refills: 0 | Status: SHIPPED | OUTPATIENT
Start: 2024-12-13

## 2024-12-13 RX ADMIN — ONDANSETRON 8 MG: 4 TABLET, ORALLY DISINTEGRATING ORAL at 18:28

## 2024-12-13 ASSESSMENT — LIFESTYLE VARIABLES
HOW OFTEN DO YOU HAVE A DRINK CONTAINING ALCOHOL: NEVER
HOW MANY STANDARD DRINKS CONTAINING ALCOHOL DO YOU HAVE ON A TYPICAL DAY: PATIENT DOES NOT DRINK

## 2024-12-13 ASSESSMENT — PAIN DESCRIPTION - LOCATION: LOCATION: ABDOMEN

## 2024-12-13 ASSESSMENT — PAIN SCALES - GENERAL: PAINLEVEL_OUTOF10: 8

## 2024-12-13 ASSESSMENT — PAIN - FUNCTIONAL ASSESSMENT: PAIN_FUNCTIONAL_ASSESSMENT: 0-10

## 2024-12-13 NOTE — ED TRIAGE NOTES
PT arrived with c/o nausea and vomiting for most of the day. States he drank a lot last night and \"does not normally drink\"

## 2024-12-13 NOTE — ED PROVIDER NOTES
noted below, none  Procedures     CRITICAL CARE TIME   none    EMERGENCY DEPARTMENT COURSE and DIFFERENTIAL DIAGNOSIS/MDM   Vitals:    Vitals:    12/13/24 1802   BP: 121/68   Pulse: 84   Resp: 18   Temp: 98.7 °F (37.1 °C)   SpO2: 98%   Weight: 99.8 kg (220 lb)   Height: 1.93 m (6' 4\")        Patient was given the following medications:  Medications   ondansetron (ZOFRAN-ODT) disintegrating tablet 8 mg (8 mg Oral Given 12/13/24 1828)       Medical Decision Making  34-year-old male without significant past medical history who presents with a chief complaint of nausea and vomiting.  Patient states that he drank in excess last night as he was celebrating.  Today has had nausea and vomiting all day and states his stomach feels \"upset.\"  Attempted to take medications at home but vomited them back up.  He denies any abdominal pain.  No urinary symptoms.  No fever.  Patient states he suspects that his vomiting is related to his alcohol intake last night.    Patient well-appearing on exam, hemodynamically stable, does not appear clinically dry.  He has no abdominal tenderness to palpation on exam suggest a surgical process.  Suspect nausea and vomiting likely related to excessive alcohol intake.  Will give antiemetics and p.o. challenge.  Given overall well appearance and stable vitals do not feel lab work or imaging is indicated at this time.    Problems Addressed:  Nausea and vomiting, unspecified vomiting type: acute illness or injury    Risk  Prescription drug management.          CLINICAL MANAGEMENT TOOLS:  Not Applicable             Patient able to tolerate PO following zofran  Will dc with zofran rx and instructions to continue to orally hydrate  ED return precautions discussed      FINAL IMPRESSION     1. Nausea and vomiting, unspecified vomiting type          DISPOSITION/PLAN   Farhad Thompson's  results have been reviewed with him.  He has been counseled regarding his diagnosis, treatment, and plan.  He verbally  dictation were completed with voice recognition software. Quite often unanticipated grammatical, syntax, homophones, and other interpretive errors are inadvertently transcribed by the computer software. Please disregards these errors. Please excuse any errors that have escaped final proofreading.)         Robyn Stevens MD  12/14/24 6030